# Patient Record
Sex: FEMALE | Race: BLACK OR AFRICAN AMERICAN | Employment: FULL TIME | URBAN - METROPOLITAN AREA
[De-identification: names, ages, dates, MRNs, and addresses within clinical notes are randomized per-mention and may not be internally consistent; named-entity substitution may affect disease eponyms.]

---

## 2020-10-08 ENCOUNTER — HOSPITAL ENCOUNTER (EMERGENCY)
Age: 50
Discharge: HOME | End: 2020-10-08
Attending: EMERGENCY MEDICINE
Payer: COMMERCIAL

## 2020-10-08 ENCOUNTER — APPOINTMENT (OUTPATIENT)
Dept: GENERAL RADIOLOGY | Age: 50
End: 2020-10-08
Payer: COMMERCIAL

## 2020-10-08 VITALS
BODY MASS INDEX: 46.55 KG/M2 | SYSTOLIC BLOOD PRESSURE: 129 MMHG | TEMPERATURE: 98.1 F | WEIGHT: 271.2 LBS | HEART RATE: 78 BPM | OXYGEN SATURATION: 99 % | RESPIRATION RATE: 15 BRPM | DIASTOLIC BLOOD PRESSURE: 89 MMHG

## 2020-10-08 LAB
BILIRUBIN URINE: NEGATIVE
BLOOD, URINE: NEGATIVE
CLARITY: CLEAR
COLOR: YELLOW
GLUCOSE URINE: NEGATIVE MG/DL
KETONES, URINE: NEGATIVE MG/DL
LEUKOCYTE ESTERASE, URINE: NEGATIVE
MICROSCOPIC EXAMINATION: NORMAL
NITRITE, URINE: NEGATIVE
PH UA: 6 (ref 5–8)
PROTEIN UA: NEGATIVE MG/DL
SPECIFIC GRAVITY UA: 1.02 (ref 1–1.03)
URINE TYPE: NORMAL
UROBILINOGEN, URINE: 1 E.U./DL

## 2020-10-08 PROCEDURE — 99284 EMERGENCY DEPT VISIT MOD MDM: CPT

## 2020-10-08 PROCEDURE — 81003 URINALYSIS AUTO W/O SCOPE: CPT

## 2020-10-08 PROCEDURE — 72170 X-RAY EXAM OF PELVIS: CPT

## 2020-10-08 RX ORDER — METHOCARBAMOL 750 MG/1
750 TABLET, FILM COATED ORAL 3 TIMES DAILY PRN
Qty: 30 TABLET | Refills: 1 | Status: SHIPPED | OUTPATIENT
Start: 2020-10-08 | End: 2020-10-18

## 2020-10-08 RX ORDER — IBUPROFEN 600 MG/1
600 TABLET ORAL EVERY 8 HOURS PRN
Qty: 20 TABLET | Refills: 1 | Status: SHIPPED | OUTPATIENT
Start: 2020-10-08

## 2020-10-08 ASSESSMENT — PAIN DESCRIPTION - DESCRIPTORS: DESCRIPTORS: DULL

## 2020-10-08 ASSESSMENT — PAIN DESCRIPTION - PAIN TYPE: TYPE: ACUTE PAIN

## 2020-10-08 ASSESSMENT — PAIN SCALES - GENERAL: PAINLEVEL_OUTOF10: 6

## 2020-10-08 ASSESSMENT — PAIN DESCRIPTION - ORIENTATION: ORIENTATION: RIGHT

## 2020-10-08 ASSESSMENT — PAIN DESCRIPTION - LOCATION: LOCATION: PELVIS

## 2020-10-08 NOTE — ED NOTES
1855:  Reviewed d/c instructions with pt. Patient discharged home with scripts  X 2. Gait steady. No complications. Reviewed d/c instructions with pt.       Seun Govea RN  10/08/20 7583

## 2020-10-09 NOTE — ED PROVIDER NOTES
TRIAGE CHIEF COMPLAINT:   Chief Complaint   Patient presents with    Pelvic Pain     R sided pelvic pain x 1.5 weeks. + nausea, + chills. Denies vomiting, denies diarrhea. HPI: Modesta Hernandez is a 44 y.o. female who presents to the Emergency Department with complaint of right inguinal pain which has been present for about 2 weeks. Pain is been worse over the last week. It worsens with change in position. She denies any recent injury but states several months ago she slipped and fell at work. She saw her primary care doctor after that but did not have any initial inguinal pain. She does have a history of lower back pain. She has had some occasional nausea but no vomiting diarrhea or constipation. Denies fever or chills. She denies abdominal pain and states her appetite is been good. She has no UTI symptoms or flank pain. Denies numbness or weakness. No bowel or bladder complaint. She has not been taking any medication for her symptoms. She has had previous hysterectomy. REVIEW OF SYSTEMS:  6 systems reviewed. Pertinent positives per HPI. Otherwise noted to be negative. Nursing notes reviewed and agree with above. Past medical/surgical history reviewed. MEDICATIONS   Discharge Medication List as of 10/8/2020  6:47 PM      START taking these medications    Details   methocarbamol (ROBAXIN-750) 750 MG tablet Take 1 tablet by mouth 3 times daily as needed (muscle soreness or spasm), Disp-30 tablet,R-1Print         CONTINUE these medications which have CHANGED    Details   ibuprofen (IBU) 600 MG tablet Take 1 tablet by mouth every 8 hours as needed for Pain or Fever (with food), Disp-20 tablet,R-1Print         CONTINUE these medications which have NOT CHANGED    Details   clotrimazole-betamethasone (LOTRISONE) 1-0.05 % cream Apply topically 2 times daily. , Disp-45 g, R-1, Print               ALLERGIES No Known Allergies      /89   Pulse 78   Temp 98.1 °F (36.7 °C) (Oral)   Resp 15 Wt 271 lb 3.2 oz (123 kg)   SpO2 99%   BMI 46.55 kg/m²   General:  No acute distress. Non toxic appearance. BMI is 46.5. Head:   Normocephalic and atraumatic  Eyes:   Conjunctiva clear, CIRO, EOM's intact. Sclera anicteric. ENT:   Mucous membranes moist  Neck:   Supple. No adenopathy or jugular venous distension  Lungs/Chest:  No respiratory distress. Lungs are clear bilaterally. CVS:   Regular rate and rhythm  Abdomen: Bowel sounds are normal.  Soft without mass. She has some mild tenderness along the right inguinal area. I do not feel any adenopathy. I see no evidence of hernia. Extremities:  Full range of motion. Complains of pain with some motions of the right hip but has normal rotation and no deformity. No knee calf or ankle tenderness. Distal neurovascular exams intact. Skin:   No rashes or lesions to exposed skin  Back:   No CVA tenderness. Patient has right paralumbar muscle tenderness with some palpable spasm. Back range of motion is significantly decreased in all directions except forward flexion. Straight leg raises are negative to 80 degrees bilaterally. There is no foot drop. No weakness in the hamstrings, quadriceps, gastrocnemius or EHL bilaterally. Normal sensation to light touch. Reflexes are symmetrical.  Neuro:  Alert and OX3. Speech clear and appropriate. No upper/lower extremity weakness. Normal sensation in all extremities. No facial asymmetry or weakness. Gait normal.  Psych:   Affect normal. Mood normal        RADIOLOGY:  XR PELVIS (1-2 VIEWS)   Final Result      No acute osseous abnormality. Mild bilateral hip osteoarthrosis. LAB  Labs Reviewed   URINALYSIS    Narrative:     Performed at:  [facility]  [address] Bol[address]   Phone [phone]       ED COURSE / MDM:  24-year-old female with right-sided inguinal pain for several weeks worsening over the last week.   This is positional in nature. She did slip and fall in the job several months ago and injured her back. Patient has right paraspinal muscle tenderness and spasm with decreased range of motion and may have aggravated the inguinal area secondary to issues with her lower back. I see no clinical evidence of hernia. Abdomen is benign. No UTI symptoms. Urinalysis was normal.  No evidence to suggest ureteral colic or pyelonephritis. She is neurologically intact. X-rays of the pelvis showed mild bilateral hip osteoarthritis and otherwise were negative. Recommended a trial of ibuprofen for pain and Robaxin for muscle tightness. She declines any time off from work. Advise follow-up with the [facility]. medical clinic to get a primary care doctor. I discussed with Sylvia Ramirez the results of the evaluation in the Emergency Department, diagnosis, care, prognosis and the importance of follow-up. The patient is stable for discharge. The patient and/or family are in agreement with the plan and all questions have been answered. Specific discharge instructions were explained, including reasons to return to the emergency department.       (Please note that portions of this note may have been completed with a voice recognition program.  Efforts were made to edit the dictation but occasionally words are mis-transcribed)        FINAL IMPRESSION:  1 --right inguinal strain                     Bailey Enriquez MD  10/09/20 6551 EENMT

## 2023-06-20 ENCOUNTER — HOSPITAL ENCOUNTER (EMERGENCY)
Age: 53
Discharge: HOME | End: 2023-06-20
Attending: EMERGENCY MEDICINE
Payer: COMMERCIAL

## 2023-06-20 VITALS
HEART RATE: 80 BPM | HEIGHT: 63 IN | RESPIRATION RATE: 18 BRPM | SYSTOLIC BLOOD PRESSURE: 133 MMHG | OXYGEN SATURATION: 97 % | DIASTOLIC BLOOD PRESSURE: 86 MMHG | TEMPERATURE: 98.8 F | WEIGHT: 182.76 LBS | BODY MASS INDEX: 32.38 KG/M2

## 2023-06-20 DIAGNOSIS — H10.33 ACUTE CONJUNCTIVITIS OF BOTH EYES, UNSPECIFIED ACUTE CONJUNCTIVITIS TYPE: Primary | ICD-10-CM

## 2023-06-20 PROCEDURE — 99283 EMERGENCY DEPT VISIT LOW MDM: CPT

## 2023-06-20 RX ORDER — CIPROFLOXACIN HYDROCHLORIDE 3.5 MG/ML
SOLUTION/ DROPS TOPICAL
Qty: 5 ML | Refills: 0 | Status: SHIPPED | OUTPATIENT
Start: 2023-06-20 | End: 2023-06-30

## 2023-06-20 ASSESSMENT — PAIN - FUNCTIONAL ASSESSMENT
PAIN_FUNCTIONAL_ASSESSMENT: 0-10
PAIN_FUNCTIONAL_ASSESSMENT: 0-10

## 2023-06-20 ASSESSMENT — VISUAL ACUITY
OD: 20/15
OU: 20/20
OS: 20/25

## 2023-06-20 ASSESSMENT — PAIN SCALES - GENERAL
PAINLEVEL_OUTOF10: 5
PAINLEVEL_OUTOF10: 3

## 2023-06-20 ASSESSMENT — PAIN DESCRIPTION - LOCATION: LOCATION: EYE

## 2023-06-20 ASSESSMENT — PAIN DESCRIPTION - ORIENTATION: ORIENTATION: LEFT

## 2023-06-20 ASSESSMENT — ENCOUNTER SYMPTOMS
EYE PAIN: 0
EYE ITCHING: 1
SORE THROAT: 0
EYE DISCHARGE: 1
PHOTOPHOBIA: 0
EYE REDNESS: 1

## 2023-06-20 ASSESSMENT — PAIN DESCRIPTION - DESCRIPTORS: DESCRIPTORS: BURNING

## 2023-06-20 NOTE — ED TRIAGE NOTES
Patient presents to the ED via private car with complaints of left eye itching, redness, and crusting. This started yesterday morning and was crusted over this morning. Denies any other sick symptoms. She has tried eye drops and states that they burned. BP is slightly elevated. Other VS are WNL. Patient is alert and oriented x4. Family at bedside.

## 2023-06-20 NOTE — ED PROVIDER NOTES
She is alert. DIAGNOSTIC RESULTS     EMERGENCY DEPARTMENT COURSE and DIFFERENTIAL DIAGNOSIS/MDM:   Vitals:    Vitals:    06/20/23 0913   BP: 133/86   Pulse: 85   Resp: 20   Temp: 98.8 °F (37.1 °C)   TempSrc: Oral   SpO2: 97%   Weight: 182 lb 12.2 oz (82.9 kg)   Height: 5' 3\" (1.6 m)     Patient was given the following medications:  Medications - No data to display      Patient was reassessed multiple times while in the emergency department. Patient remained stable    Is this patient to be included in the SEP-1 Core Measure due to severe sepsis or septic shock? No   Exclusion criteria - the patient is NOT to be included for SEP-1 Core Measure due to:  2+ SIRS criteria are not met      I am the Primary Clinician of Record. MDM  Number of Diagnoses or Management Options  Diagnosis management comments: The patient was felt to have acute conjunctivitis. She does not use contacts. I have no concerns about other serious problems. Her cornea was clear. There was no corneal ulcers or foreign bodies. FINAL IMPRESSION      1. Acute conjunctivitis of both eyes, unspecified acute conjunctivitis type          DISPOSITION/PLAN   DISPOSITION Decision To Discharge 06/20/2023 09:22:24 AM      PATIENT REFERRED TO:  No follow-up provider specified. DISCHARGE MEDICATIONS:  New Prescriptions    CIPROFLOXACIN (CILOXAN) 0.3 % OPHTHALMIC SOLUTION    One drop to the affected eye every 2 hours while awake on day 1 then 4 times a day for the next 9 days, 10 days total treatment. Controlled Substances Monitoring:     No flowsheet data found. (Please note that portions of this note were completed with a voice recognition program.  Efforts were made to edit the dictations but occasionally words are mis-transcribed. )    Angelika Lee MD (electronically signed)  Attending Emergency Physician            Mounika Patton MD  06/20/23 7723

## 2023-06-20 NOTE — DISCHARGE INSTRUCTIONS
Put the drops in both eyes since it starting to spread to your right eye. Follow-up with a eye specialist if you are not completely better in 1 week sooner if worse or problems.   Seek medical attention immediately if it worsens or you have eye pain or concerns

## 2023-06-20 NOTE — ED NOTES
D/C: Order noted for d/c. Pt confirmed d/c paperwork and one prescription has correct name. Discharge and education instructions reviewed with patient. Teach-back successful. Pt verbalized understanding and signed d/c papers. Pt denied questions at this time. No acute distress noted. Patient instructed to follow-up as noted - return to emergency department if symptoms worsen. Patient verbalized understanding. Discharged per EDMD with discharge instructions. Pt discharged to private vehicle. Patient stable upon departure. Thanked patient for choosing [facility] for care. Provider aware of patient pain at time of discharge.        Vista Hatchet, RN  06/20/23 3357

## 2023-11-12 ENCOUNTER — APPOINTMENT (OUTPATIENT)
Dept: CT IMAGING | Age: 53
End: 2023-11-12
Payer: COMMERCIAL

## 2023-11-12 ENCOUNTER — HOSPITAL ENCOUNTER (EMERGENCY)
Age: 53
Discharge: HOME | End: 2023-11-12
Attending: STUDENT IN AN ORGANIZED HEALTH CARE EDUCATION/TRAINING PROGRAM
Payer: COMMERCIAL

## 2023-11-12 VITALS
HEIGHT: 64 IN | WEIGHT: 184.3 LBS | SYSTOLIC BLOOD PRESSURE: 155 MMHG | TEMPERATURE: 98 F | DIASTOLIC BLOOD PRESSURE: 91 MMHG | OXYGEN SATURATION: 100 % | BODY MASS INDEX: 31.47 KG/M2 | RESPIRATION RATE: 18 BRPM | HEART RATE: 67 BPM

## 2023-11-12 DIAGNOSIS — M54.50 ACUTE EXACERBATION OF CHRONIC LOW BACK PAIN: ICD-10-CM

## 2023-11-12 DIAGNOSIS — G89.29 ACUTE EXACERBATION OF CHRONIC LOW BACK PAIN: ICD-10-CM

## 2023-11-12 DIAGNOSIS — R10.9 ACUTE ABDOMINAL PAIN IN RIGHT FLANK: Primary | ICD-10-CM

## 2023-11-12 LAB
ALBUMIN SERPL-MCNC: 4.1 G/DL (ref 3.4–5)
ALBUMIN/GLOB SERPL: 1.4 {RATIO} (ref 1.1–2.2)
ALP SERPL-CCNC: 76 U/L (ref 40–129)
ALT SERPL-CCNC: 9 U/L (ref 10–40)
ANION GAP SERPL CALCULATED.3IONS-SCNC: 15 MMOL/L (ref 3–16)
AST SERPL-CCNC: 26 U/L (ref 15–37)
BASOPHILS # BLD: 0 K/UL (ref 0–0.2)
BASOPHILS NFR BLD: 0.4 %
BILIRUB SERPL-MCNC: 0.3 MG/DL (ref 0–1)
BILIRUB UR QL STRIP.AUTO: NEGATIVE
BUN SERPL-MCNC: 10 MG/DL (ref 7–20)
CALCIUM SERPL-MCNC: 9.3 MG/DL (ref 8.3–10.6)
CHLORIDE SERPL-SCNC: 103 MMOL/L (ref 99–110)
CLARITY UR: CLEAR
CO2 SERPL-SCNC: 22 MMOL/L (ref 21–32)
COLOR UR: YELLOW
CREAT SERPL-MCNC: 0.8 MG/DL (ref 0.6–1.1)
DEPRECATED RDW RBC AUTO: 14.5 % (ref 12.4–15.4)
EOSINOPHIL # BLD: 0.1 K/UL (ref 0–0.6)
EOSINOPHIL NFR BLD: 2.4 %
GFR SERPLBLD CREATININE-BSD FMLA CKD-EPI: >60 ML/MIN/{1.73_M2}
GLUCOSE SERPL-MCNC: 76 MG/DL (ref 70–99)
GLUCOSE UR STRIP.AUTO-MCNC: NEGATIVE MG/DL
HCT VFR BLD AUTO: 39.9 % (ref 36–48)
HGB BLD-MCNC: 13.1 G/DL (ref 12–16)
HGB UR QL STRIP.AUTO: NEGATIVE
KETONES UR STRIP.AUTO-MCNC: ABNORMAL MG/DL
LEUKOCYTE ESTERASE UR QL STRIP.AUTO: NEGATIVE
LYMPHOCYTES # BLD: 1 K/UL (ref 1–5.1)
LYMPHOCYTES NFR BLD: 18.2 %
MCH RBC QN AUTO: 29.3 PG (ref 26–34)
MCHC RBC AUTO-ENTMCNC: 32.9 G/DL (ref 31–36)
MCV RBC AUTO: 89 FL (ref 80–100)
MONOCYTES # BLD: 0.4 K/UL (ref 0–1.3)
MONOCYTES NFR BLD: 7.1 %
NEUTROPHILS # BLD: 4.1 K/UL (ref 1.7–7.7)
NEUTROPHILS NFR BLD: 71.9 %
NITRITE UR QL STRIP.AUTO: NEGATIVE
PH UR STRIP.AUTO: 6 [PH] (ref 5–8)
PLATELET # BLD AUTO: 222 K/UL (ref 135–450)
PMV BLD AUTO: 9.2 FL (ref 5–10.5)
POTASSIUM SERPL-SCNC: 4.4 MMOL/L (ref 3.5–5.1)
PROT SERPL-MCNC: 7 G/DL (ref 6.4–8.2)
PROT UR STRIP.AUTO-MCNC: NEGATIVE MG/DL
RBC # BLD AUTO: 4.49 M/UL (ref 4–5.2)
SODIUM SERPL-SCNC: 140 MMOL/L (ref 136–145)
SP GR UR STRIP.AUTO: 1.02 (ref 1–1.03)
UA COMPLETE W REFLEX CULTURE PNL UR: ABNORMAL
UA DIPSTICK W REFLEX MICRO PNL UR: ABNORMAL
URN SPEC COLLECT METH UR: ABNORMAL
UROBILINOGEN UR STRIP-ACNC: 1 E.U./DL
WBC # BLD AUTO: 5.7 K/UL (ref 4–11)

## 2023-11-12 PROCEDURE — 96374 THER/PROPH/DIAG INJ IV PUSH: CPT

## 2023-11-12 PROCEDURE — 99284 EMERGENCY DEPT VISIT MOD MDM: CPT

## 2023-11-12 PROCEDURE — 6360000002 HC RX W HCPCS: Performed by: STUDENT IN AN ORGANIZED HEALTH CARE EDUCATION/TRAINING PROGRAM

## 2023-11-12 PROCEDURE — 36415 COLL VENOUS BLD VENIPUNCTURE: CPT

## 2023-11-12 PROCEDURE — 80053 COMPREHEN METABOLIC PANEL: CPT

## 2023-11-12 PROCEDURE — 6370000000 HC RX 637 (ALT 250 FOR IP): Performed by: STUDENT IN AN ORGANIZED HEALTH CARE EDUCATION/TRAINING PROGRAM

## 2023-11-12 PROCEDURE — 81003 URINALYSIS AUTO W/O SCOPE: CPT

## 2023-11-12 PROCEDURE — 85025 COMPLETE CBC W/AUTO DIFF WBC: CPT

## 2023-11-12 PROCEDURE — 74176 CT ABD & PELVIS W/O CONTRAST: CPT

## 2023-11-12 RX ORDER — KETOROLAC TROMETHAMINE 15 MG/ML
15 INJECTION, SOLUTION INTRAMUSCULAR; INTRAVENOUS ONCE
Status: COMPLETED | OUTPATIENT
Start: 2023-11-12 | End: 2023-11-12

## 2023-11-12 RX ORDER — METHOCARBAMOL 750 MG/1
750 TABLET, FILM COATED ORAL EVERY 6 HOURS PRN
COMMUNITY
Start: 2021-08-03 | End: 2023-11-12 | Stop reason: SDUPTHER

## 2023-11-12 RX ORDER — ACETAMINOPHEN 500 MG
1000 TABLET ORAL EVERY 8 HOURS PRN
COMMUNITY
Start: 2023-01-20

## 2023-11-12 RX ORDER — METHOCARBAMOL 750 MG/1
750 TABLET, FILM COATED ORAL EVERY 6 HOURS PRN
Qty: 40 TABLET | Refills: 0 | Status: SHIPPED | OUTPATIENT
Start: 2023-11-12

## 2023-11-12 RX ORDER — CYCLOBENZAPRINE HCL 10 MG
10 TABLET ORAL ONCE
Status: COMPLETED | OUTPATIENT
Start: 2023-11-12 | End: 2023-11-12

## 2023-11-12 RX ADMIN — CYCLOBENZAPRINE 10 MG: 10 TABLET, FILM COATED ORAL at 21:34

## 2023-11-12 RX ADMIN — KETOROLAC TROMETHAMINE 15 MG: 15 INJECTION, SOLUTION INTRAMUSCULAR; INTRAVENOUS at 21:33

## 2023-11-12 ASSESSMENT — PAIN SCALES - GENERAL: PAINLEVEL_OUTOF10: 7

## 2023-11-12 ASSESSMENT — PAIN DESCRIPTION - LOCATION: LOCATION: GROIN;BACK

## 2023-11-12 ASSESSMENT — PAIN DESCRIPTION - PAIN TYPE: TYPE: ACUTE PAIN

## 2023-11-12 ASSESSMENT — PAIN - FUNCTIONAL ASSESSMENT: PAIN_FUNCTIONAL_ASSESSMENT: 0-10

## 2023-11-13 NOTE — ED TRIAGE NOTES
started at the same time. States this pain is worse with lifting. Patient reports some increased tingling/numbness to left leg from baseline. Patient denies loss of bowel or bladder control. Reports change in vaginal discharge. States increased pressure with urination. Patient resting on bed, respirations even and easy at this time. Patient appears uncomfortable.

## 2023-11-13 NOTE — ED NOTES
Patient ambulatory to bathroom for urine sample at this time. MD notified of patient presentation.      Areli Lopez RN  11/12/23 1940

## 2023-11-13 NOTE — ED NOTES
Report given to Erick De La Torre, RN at this time, all questions answered. Denies any further questions at this time. No further patient contact.      Jaye Bertrand RN  11/12/23 0879

## 2023-11-15 NOTE — ED PROVIDER NOTES
[address] [facility],[address] ENCOUNTER        Pt Name: Bubba Garcia  MRN: [id number]  [address] [facility] [place] 1970  Date of evaluation: 11/12/2023  Provider: Zelalem Cedillo MD  PCP: Hesham Whitesdie  Note Started: 6:40 AM EST 11/15/23    CHIEF COMPLAINT       Back pain    HISTORY OF PRESENT ILLNESS: 1 or more Elements     History from : Patient    Limitations to history : None    Bubba Garcia is a 52 y.o. female who presents with left-sided back pain, radiates down the left leg, also some radiation down the right leg, sensation of numbness and tingling to the left leg but no loss of sensation. No saddle anesthesia. No bladder or bowel incontinence. No fevers. History of back pain, prior back surgeries. No IV drug use. Upcoming appoint with Dr. Terese Link with [facility]. Symptoms not otherwise alleviated or exacerbated by other factors. Nursing Notes were all reviewed and agreed with or any disagreements were addressed in the HPI. REVIEW OF SYSTEMS :      Positives and Pertinent negatives as per HPI. ROS otherwise unremarkable. SURGICAL HISTORY     Past Surgical History:   Procedure Laterality Date    BARIATRIC SURGERY      CHOLECYSTECTOMY      HYSTERECTOMY (CERVIX STATUS UNKNOWN)          [facility]       Discharge Medication List as of 11/12/2023 11:27 PM        CONTINUE these medications which have NOT CHANGED    Details   acetaminophen (TYLENOL) 500 MG tablet Take 2 tablets by mouth every 8 hours as neededHistorical Med      ibuprofen (IBU) 600 MG tablet Take 1 tablet by mouth every 8 hours as needed for Pain or Fever (with food), Disp-20 tablet,R-1Print             ALLERGIES     Morphine    FAMILYHISTORY     History reviewed. No pertinent family history. SOCIAL HISTORY       Social History     Tobacco Use    Smoking status: Some Days    Smokeless tobacco: Never   Substance Use Topics    Alcohol use: Yes     Comment: occas    Drug use:  No

## 2024-10-19 ENCOUNTER — APPOINTMENT (OUTPATIENT)
Dept: GENERAL RADIOLOGY | Age: 54
End: 2024-10-19
Attending: EMERGENCY MEDICINE
Payer: COMMERCIAL

## 2024-10-19 ENCOUNTER — HOSPITAL ENCOUNTER (EMERGENCY)
Age: 54
Discharge: HOME | End: 2024-10-19
Attending: EMERGENCY MEDICINE
Payer: COMMERCIAL

## 2024-10-19 VITALS
WEIGHT: 192.02 LBS | OXYGEN SATURATION: 97 % | DIASTOLIC BLOOD PRESSURE: 89 MMHG | HEIGHT: 63 IN | HEART RATE: 72 BPM | TEMPERATURE: 98.5 F | SYSTOLIC BLOOD PRESSURE: 134 MMHG | RESPIRATION RATE: 16 BRPM | BODY MASS INDEX: 34.02 KG/M2

## 2024-10-19 DIAGNOSIS — S52.501A CLOSED FRACTURE OF DISTAL END OF RIGHT RADIUS, UNSPECIFIED FRACTURE MORPHOLOGY, INITIAL ENCOUNTER: Primary | ICD-10-CM

## 2024-10-19 PROCEDURE — 6370000000 HC RX 637 (ALT 250 FOR IP): Performed by: EMERGENCY MEDICINE

## 2024-10-19 PROCEDURE — 99284 EMERGENCY DEPT VISIT MOD MDM: CPT

## 2024-10-19 PROCEDURE — 73110 X-RAY EXAM OF WRIST: CPT

## 2024-10-19 PROCEDURE — 29125 APPL SHORT ARM SPLINT STATIC: CPT

## 2024-10-19 RX ORDER — IBUPROFEN 800 MG/1
800 TABLET, FILM COATED ORAL EVERY 8 HOURS PRN
Qty: 20 TABLET | Refills: 0 | Status: SHIPPED | OUTPATIENT
Start: 2024-10-19

## 2024-10-19 RX ORDER — IBUPROFEN 800 MG/1
800 TABLET, FILM COATED ORAL ONCE
Status: COMPLETED | OUTPATIENT
Start: 2024-10-19 | End: 2024-10-19

## 2024-10-19 RX ADMIN — IBUPROFEN 800 MG: 800 TABLET, FILM COATED ORAL at 11:51

## 2024-10-19 ASSESSMENT — PAIN DESCRIPTION - FREQUENCY: FREQUENCY: CONTINUOUS

## 2024-10-19 ASSESSMENT — PAIN SCALES - GENERAL
PAINLEVEL_OUTOF10: 5
PAINLEVEL_OUTOF10: 5
PAINLEVEL_OUTOF10: 3

## 2024-10-19 ASSESSMENT — PAIN DESCRIPTION - ONSET: ONSET: ON-GOING

## 2024-10-19 ASSESSMENT — PAIN - FUNCTIONAL ASSESSMENT
PAIN_FUNCTIONAL_ASSESSMENT: PREVENTS OR INTERFERES SOME ACTIVE ACTIVITIES AND ADLS
PAIN_FUNCTIONAL_ASSESSMENT: ACTIVITIES ARE NOT PREVENTED

## 2024-10-19 ASSESSMENT — PAIN DESCRIPTION - LOCATION: LOCATION: WRIST

## 2024-10-19 ASSESSMENT — LIFESTYLE VARIABLES
HOW MANY STANDARD DRINKS CONTAINING ALCOHOL DO YOU HAVE ON A TYPICAL DAY: PATIENT DOES NOT DRINK
HOW OFTEN DO YOU HAVE A DRINK CONTAINING ALCOHOL: NEVER

## 2024-10-19 ASSESSMENT — PAIN DESCRIPTION - DESCRIPTORS: DESCRIPTORS: ACHING

## 2024-10-19 ASSESSMENT — PAIN DESCRIPTION - ORIENTATION: ORIENTATION: RIGHT

## 2024-10-19 ASSESSMENT — PAIN DESCRIPTION - PAIN TYPE: TYPE: ACUTE PAIN

## 2024-10-19 NOTE — ED PROVIDER NOTES
eMERGENCY dEPARTMENT eNCOUnter      Pt Name: Odessa Quintana  MRN: [id number]  Birthdate 1970  Date of evaluation: 10/19/2024  Provider: LINDA CORTES MD     CHIEF COMPLAINT       Chief Complaint   Patient presents with    Wrist Injury     Pt to ED with c/o injury to right wrist. Pt states she fell onto her right wrist yesterday evening while getting something off of a shelf, landing on her right wrist. Denies hitting her head, denies any other injury. C/o pain and swelling to right wrist and forearm.          HISTORY OF PRESENT ILLNESS   (Location/Symptom, Timing/Onset,Context/Setting, Quality, Duration, Modifying Factors, Severity) Note limiting factors.   HPI    Odessa Quintana is a 54 y.o. female who presents to the emergency department with a right wrist injury at home yesterday.  Patient states that she was trying to get something off the shelf when she slipped landed on her outstretched hand and moderate swelling to the wrist.  Patient states movement makes it worse.  Patient denies any other injury.  Patient states from the elbow to the wrist and hand it swollen and tender.  There is no obvious deformity.  Patient is right-hand dominant.    Nursing Notes were reviewed.    REVIEW OFSYSTEMS    (2+ for level 4; 10+ for level 5)   Review of Systems    General: No fevers, chills or night sweats, No weight loss    Head:  No Sore throat,  No Ear Pain    Chest:  Nontender.  No Cough, No SOB,  Chest Pain    GI: No abdominal pain or vomiting    : No dysuria or hematuria    Musculoskeletal: No unrelenting pain or night pain.  Right hand swelling and pain    Neurologic: No bowel or bladder incontinence, No saddle anesthesia, No leg weakness    All other systems reviewed and are negative.        PAST MEDICAL HISTORY     Past Medical History:   Diagnosis Date    Arthritis     Back pain     Knee pain     Shoulder pain        SURGICAL HISTORY       Past Surgical History:   Procedure Laterality Date    BARIATRIC

## 2024-10-19 NOTE — ED NOTES
Volar splint applied to RUE by ED hernán Schultz with this RN present to assist. Circulation checks wnl s/p splint placement. Pt tolerated well, no complications noted or reported. Education provided to pt regarding splint. Pt verbalized understanding, all questions answered. Splint checked by ED MD Mendez s/p placement.

## 2024-10-19 NOTE — ED NOTES
Ice pack applied to right wrist as ordered by ED MD. Pt tolerating well, will continue to monitor.

## 2024-10-19 NOTE — DISCHARGE INSTRUCTIONS
Made continue to use ice.  Take ibuprofen for pain and swelling.  Keep the wrist immobilized with a splint until seen by orthopedic.  Recommend follow-up.  Call on Monday for appointment.

## 2024-10-19 NOTE — ED NOTES
Sling applied to RUE by ED medic Antoine as ordered by ED MD. No complications noted or reported. Pt tolerated well. Education provided, pt verbalized understanding.

## 2024-10-19 NOTE — ED NOTES
Pt to ED with c/o injury to right wrist. Pt states she fell onto her right wrist yesterday evening while getting something off of a shelf, landing on her right wrist. Denies hitting her head, denies any other injury. C/o pain and swelling to right wrist and forearm.

## 2024-10-21 ENCOUNTER — TELEPHONE (OUTPATIENT)
Dept: ORTHOPEDIC SURGERY | Age: 54
End: 2024-10-21

## 2024-10-21 NOTE — TELEPHONE ENCOUNTER
Appointment Request     Patient requesting earlier appointment: Yes  Appointment offered to patient:10/28/24  Patient Contact Number: [phone]      PATIENT REQ A SOONER APPT FOR HER RT FOREARM INJURY    SHE IS CURRENTLY SCHEDULED ON 10/28/24    PLEASE CALL PATIENT BACK AT THE ABOVE NUMBER

## 2024-10-22 ENCOUNTER — OFFICE VISIT (OUTPATIENT)
Dept: ORTHOPEDIC SURGERY | Age: 54
End: 2024-10-22
Payer: COMMERCIAL

## 2024-10-22 VITALS — HEIGHT: 63 IN | BODY MASS INDEX: 34.02 KG/M2 | WEIGHT: 192 LBS

## 2024-10-22 DIAGNOSIS — S63.501A WRIST SPRAIN, RIGHT, INITIAL ENCOUNTER: ICD-10-CM

## 2024-10-22 DIAGNOSIS — M79.641 PAIN OF RIGHT HAND: Primary | ICD-10-CM

## 2024-10-22 PROCEDURE — 99202 OFFICE O/P NEW SF 15 MIN: CPT | Performed by: PHYSICIAN ASSISTANT

## 2024-10-24 ENCOUNTER — OFFICE VISIT (OUTPATIENT)
Dept: ORTHOPEDIC SURGERY | Age: 54
End: 2024-10-24

## 2024-10-24 VITALS — BODY MASS INDEX: 34.02 KG/M2 | HEIGHT: 63 IN | WEIGHT: 192 LBS | RESPIRATION RATE: 14 BRPM

## 2024-10-24 DIAGNOSIS — S63.501A WRIST SPRAIN, RIGHT, INITIAL ENCOUNTER: Primary | ICD-10-CM

## 2024-10-24 NOTE — PROGRESS NOTES
This 54 y.o.  right hand dominant  is seen in hand surgery consultation for SHERRIE Chaves with a chief complaint of injury to their right wrist, which was injured 1 week ago when she took a low velocity fall.  She noticed pain, mild swelling, and slight bruising.  She was evaluated at the [facility] ED the next day.  Xrays were obtained, read as showing a \"possible fracture of the distal radius\" and the patient was splinted and referred for hand/upper extremity evaluation and treatment.  She was seen by Joaquin Ragsdale four days later.  Repeat Xrays were read as not showing a fracture and she was re splinted and  sent to me for consultation. There is no is no history of additional significant injury.  Symptoms have not changed since the date of injury. The pain assessment has been reviewed and is correct.    The patient's social history, past medical history, family history, medications, allergies and review of systems, entered 10/24/24,  have been reviewed, and dated and are recorded in the chart.    On physical examination the patient is Height: 160 cm (5' 3\") tall and weighs Weight - Scale: 87.1 kg (192 lb).  Respirations are 18 per minute.  The patient is well nourished, is oriented to time and place, demonstrates appropriate mood and affect as well as normal gait and station.  There is no soft tissue swelling present about the right wrist or hand, but mild swelling to the thumb and fingers due to tightness of the splint..  There is  minimal discoloration of the dorsal bases of the fingers.  There is no deformity.   Tenderness is not present on palpation in the area of the right distal radius. Ulna or carpus.  Range of motion of the hand and wrist is limited due to constant splinting and fear of pain,only on the right.    Skin is intact, as is distal circulation and sensation.  Gross muscle strength is limited only on the right   Hand and wrist joints are stable.  There are no

## 2024-10-29 ENCOUNTER — APPOINTMENT (OUTPATIENT)
Dept: CT IMAGING | Age: 54
End: 2024-10-29
Payer: COMMERCIAL

## 2024-10-29 ENCOUNTER — APPOINTMENT (OUTPATIENT)
Dept: GENERAL RADIOLOGY | Age: 54
End: 2024-10-29
Payer: COMMERCIAL

## 2024-10-29 ENCOUNTER — HOSPITAL ENCOUNTER (EMERGENCY)
Age: 54
Discharge: HOME | End: 2024-10-30
Attending: STUDENT IN AN ORGANIZED HEALTH CARE EDUCATION/TRAINING PROGRAM
Payer: COMMERCIAL

## 2024-10-29 VITALS
BODY MASS INDEX: 35.16 KG/M2 | HEIGHT: 63 IN | OXYGEN SATURATION: 98 % | TEMPERATURE: 98.2 F | RESPIRATION RATE: 16 BRPM | DIASTOLIC BLOOD PRESSURE: 101 MMHG | HEART RATE: 68 BPM | SYSTOLIC BLOOD PRESSURE: 113 MMHG | WEIGHT: 198.41 LBS

## 2024-10-29 DIAGNOSIS — S69.91XA INJURY OF RIGHT HAND, INITIAL ENCOUNTER: Primary | ICD-10-CM

## 2024-10-29 LAB
ANION GAP SERPL CALCULATED.3IONS-SCNC: 12 MMOL/L (ref 3–16)
BASOPHILS # BLD: 0 K/UL (ref 0–0.2)
BASOPHILS NFR BLD: 0.4 %
BUN SERPL-MCNC: 18 MG/DL (ref 7–20)
CALCIUM SERPL-MCNC: 9.4 MG/DL (ref 8.3–10.6)
CHLORIDE SERPL-SCNC: 105 MMOL/L (ref 99–110)
CO2 SERPL-SCNC: 24 MMOL/L (ref 21–32)
CREAT SERPL-MCNC: 0.8 MG/DL (ref 0.6–1.1)
DEPRECATED RDW RBC AUTO: 15.9 % (ref 12.4–15.4)
EOSINOPHIL # BLD: 0.2 K/UL (ref 0–0.6)
EOSINOPHIL NFR BLD: 4.2 %
ERYTHROCYTE [SEDIMENTATION RATE] IN BLOOD BY WESTERGREN METHOD: 9 MM/HR (ref 0–30)
GFR SERPLBLD CREATININE-BSD FMLA CKD-EPI: 86 ML/MIN/{1.73_M2}
GLUCOSE SERPL-MCNC: 88 MG/DL (ref 70–99)
HCT VFR BLD AUTO: 39 % (ref 36–48)
HGB BLD-MCNC: 12.8 G/DL (ref 12–16)
LYMPHOCYTES # BLD: 0.9 K/UL (ref 1–5.1)
LYMPHOCYTES NFR BLD: 15.7 %
MCH RBC QN AUTO: 28.6 PG (ref 26–34)
MCHC RBC AUTO-ENTMCNC: 32.8 G/DL (ref 31–36)
MCV RBC AUTO: 87.2 FL (ref 80–100)
MONOCYTES # BLD: 0.6 K/UL (ref 0–1.3)
MONOCYTES NFR BLD: 10.2 %
NEUTROPHILS # BLD: 4.1 K/UL (ref 1.7–7.7)
NEUTROPHILS NFR BLD: 69.5 %
PLATELET # BLD AUTO: 222 K/UL (ref 135–450)
PMV BLD AUTO: 9.2 FL (ref 5–10.5)
POTASSIUM SERPL-SCNC: 4.2 MMOL/L (ref 3.5–5.1)
RBC # BLD AUTO: 4.47 M/UL (ref 4–5.2)
SODIUM SERPL-SCNC: 141 MMOL/L (ref 136–145)
WBC # BLD AUTO: 6 K/UL (ref 4–11)

## 2024-10-29 PROCEDURE — 36415 COLL VENOUS BLD VENIPUNCTURE: CPT

## 2024-10-29 PROCEDURE — 6360000002 HC RX W HCPCS: Performed by: STUDENT IN AN ORGANIZED HEALTH CARE EDUCATION/TRAINING PROGRAM

## 2024-10-29 PROCEDURE — 73110 X-RAY EXAM OF WRIST: CPT

## 2024-10-29 PROCEDURE — 80048 BASIC METABOLIC PNL TOTAL CA: CPT

## 2024-10-29 PROCEDURE — 96374 THER/PROPH/DIAG INJ IV PUSH: CPT

## 2024-10-29 PROCEDURE — 86140 C-REACTIVE PROTEIN: CPT

## 2024-10-29 PROCEDURE — 99285 EMERGENCY DEPT VISIT HI MDM: CPT

## 2024-10-29 PROCEDURE — 6360000004 HC RX CONTRAST MEDICATION: Performed by: STUDENT IN AN ORGANIZED HEALTH CARE EDUCATION/TRAINING PROGRAM

## 2024-10-29 PROCEDURE — 85025 COMPLETE CBC W/AUTO DIFF WBC: CPT

## 2024-10-29 PROCEDURE — 73201 CT UPPER EXTREMITY W/DYE: CPT

## 2024-10-29 PROCEDURE — 85652 RBC SED RATE AUTOMATED: CPT

## 2024-10-29 RX ORDER — KETOROLAC TROMETHAMINE 15 MG/ML
15 INJECTION, SOLUTION INTRAMUSCULAR; INTRAVENOUS ONCE
Status: COMPLETED | OUTPATIENT
Start: 2024-10-29 | End: 2024-10-29

## 2024-10-29 RX ADMIN — IOMEPROL INJECTION 100 ML: 714 INJECTION, SOLUTION INTRAVASCULAR at 23:50

## 2024-10-29 RX ADMIN — KETOROLAC TROMETHAMINE 15 MG: 15 INJECTION, SOLUTION INTRAMUSCULAR; INTRAVENOUS at 23:40

## 2024-10-29 ASSESSMENT — PAIN - FUNCTIONAL ASSESSMENT: PAIN_FUNCTIONAL_ASSESSMENT: 0-10

## 2024-10-29 ASSESSMENT — PAIN SCALES - GENERAL
PAINLEVEL_OUTOF10: 8
PAINLEVEL_OUTOF10: 8

## 2024-10-30 LAB — CRP SERPL-MCNC: <3 MG/L (ref 0–5.1)

## 2024-10-30 NOTE — DISCHARGE INSTRUCTIONS
Today you are seen here Emergency Department for a hand injury.  Your CT scans here today were reassuring.  Need to follow-up with the orthopedic doctors.  Return for worsening pain fevers chills and new/concerning symptoms such as worsening arm pain swelling chest pain or difficulty in breathing    CT HAND RIGHT W CONTRAST   Final Result   1. No evidence of acute fracture or dislocation.   2. Subcutaneous soft tissue swelling in the thumb and index finger suggesting   edema.         CT WRIST RIGHT W CONTRAST   Final Result   No acute osseous abnormality.         XR WRIST RIGHT (MIN 3 VIEWS)   Final Result   No evidence for a healing fracture

## 2024-10-30 NOTE — ED PROVIDER NOTES
Protein    Inpatient consult to Orthopedic Surgery        Medical reasoning and significant workup findings:   No acute distress, vital signs here are reassuring  Patient is here today with acute on chronic right hand pain she has been seen by orthopedic surgery previously for this and they felt the symptoms were more consistent with a sprain but she is here today for a second opinion.  On examination there is evidence of swelling, I do not believe the swelling is indicated of an infection this is further supported with the CT images here and normal laboratory studies.  The swelling appears to be more or less following a specific track of the ligament does not appear to be diffusely swollen throughout the entire arm he close concern for DVT.  Spoke with orthopedic surgery regarding the CT scan findings and workup here today he recommended placing patient in a Velcro wrist splint and to follow-up with orthopedics outpatient is reasonable.  Patient is counseled return precaution         Impression: Wrist and hand pain    Patient's results were discussed with the patient. Patient's questions were answered. I reviewed the patients medical records and noted there allergies, past medical history, and previous visits, pertinent information summarized in HPI. I reviewed the nursing notes.    Feel that patient is appropriate for discharge to follow up with orthopedic surgery. Patient agreeable with this plan. Return precautions given. Patient discharged in stable condition.           Mohinder Newman MD  10/30/24 7958

## 2024-10-30 NOTE — ED NOTES
D/C: Order noted for d/c. Pt confirmed d/c paperwork has correct name. Discharge and education instructions reviewed with patient. Teach-back successful.  Pt verbalized understanding and denied questions at this time. No acute distress noted. Patient instructed to follow-up as noted - return to emergency department if symptoms worsen. Patient verbalized understanding. Discharged per EDMD with discharge instructions. Pt discharged to private vehicle. Patient stable upon departure. Thanked patient for [facility] for care. Provider aware of patient pain at time of discharge. Pt. A&OX4 and ambulatory.    PAST SURGICAL HISTORY:  No significant past surgical history

## 2024-10-31 ENCOUNTER — OFFICE VISIT (OUTPATIENT)
Dept: ORTHOPEDIC SURGERY | Age: 54
End: 2024-10-31
Payer: MEDICARE

## 2024-10-31 VITALS — WEIGHT: 198 LBS | BODY MASS INDEX: 35.08 KG/M2 | HEIGHT: 63 IN

## 2024-10-31 DIAGNOSIS — S63.501A WRIST SPRAIN, RIGHT, INITIAL ENCOUNTER: Primary | ICD-10-CM

## 2024-10-31 PROCEDURE — 99214 OFFICE O/P EST MOD 30 MIN: CPT | Performed by: ORTHOPAEDIC SURGERY

## 2024-10-31 NOTE — PROGRESS NOTES
I last examined this patient one week ago at which time I diagnosed a low grade sprain of her right wrist, advised her to stop using her overly tight wrist splint, perform home exercises to correct her stiffness and call me in 10-14 days if she was not improving.  Five days later she presented to the ED at [facility] complaining of worsening pain.  Xrays were repeated and a CT scan was done, both normal.  She returns today with the same complaints as last week. She denies re injury.    The patient's social history, past medical history, family history, medications, allergies and review of systems have been reviewed, dated 10/22/24 and are recorded in the chart.    I have personally examined and reviewed all previous imaging studies(recent wrist Xrays and CT Scan of right hand and wrist) , laboratory tests(CRP, ESR, BMP, CBC+Diff) and medical encounters pertinent to this patient's visit today.     On examination there is less swelling of the right wrist and hand, no deformity, no discoloration and less tenderness at the injury site.  Range of motion is improved, accompanied by poor effort, unusual posturing and complaints of pain. Skin is intact, as is distal circulation and sensation.  Gross muscle strength is diminished on the right with poor patient effort.  Hand and wrist joints are stable.  There are no subcutaneous nodules or enlarged epitrochlear lymph nodes.     Review and independent interpretation of the recent external Xrays and CT scans  of the right hand and wrist demonstrate no fracture or dislocation.  The radiologist's report concurs.     Impression: Low grade sprain injury right wrist, with clinical evidence of improvement.    The patient is furnished with a copies of the radiologist's reports.     She is instructed about activity precautions and a range of motion exercise program, which is fully discussed and demonstrated.     The usual course of events in the resolution

## 2025-01-15 ENCOUNTER — HOSPITAL ENCOUNTER (EMERGENCY)
Age: 55
Discharge: HOME | End: 2025-01-15
Attending: EMERGENCY MEDICINE
Payer: MEDICARE

## 2025-01-15 ENCOUNTER — APPOINTMENT (OUTPATIENT)
Dept: GENERAL RADIOLOGY | Age: 55
End: 2025-01-15
Payer: MEDICARE

## 2025-01-15 VITALS
HEART RATE: 98 BPM | SYSTOLIC BLOOD PRESSURE: 124 MMHG | HEIGHT: 63 IN | RESPIRATION RATE: 16 BRPM | WEIGHT: 184.13 LBS | DIASTOLIC BLOOD PRESSURE: 86 MMHG | TEMPERATURE: 98.3 F | OXYGEN SATURATION: 99 % | BODY MASS INDEX: 32.62 KG/M2

## 2025-01-15 DIAGNOSIS — S39.012A STRAIN OF LUMBAR REGION, INITIAL ENCOUNTER: Primary | ICD-10-CM

## 2025-01-15 PROCEDURE — 99284 EMERGENCY DEPT VISIT MOD MDM: CPT

## 2025-01-15 PROCEDURE — 72100 X-RAY EXAM L-S SPINE 2/3 VWS: CPT

## 2025-01-15 PROCEDURE — 96372 THER/PROPH/DIAG INJ SC/IM: CPT

## 2025-01-15 PROCEDURE — 6370000000 HC RX 637 (ALT 250 FOR IP): Performed by: EMERGENCY MEDICINE

## 2025-01-15 PROCEDURE — 6360000002 HC RX W HCPCS: Performed by: EMERGENCY MEDICINE

## 2025-01-15 RX ORDER — LIDOCAINE 4 G/G
1 PATCH TOPICAL ONCE
Status: DISCONTINUED | OUTPATIENT
Start: 2025-01-15 | End: 2025-01-15 | Stop reason: HOSPADM

## 2025-01-15 RX ORDER — ORPHENADRINE CITRATE 30 MG/ML
60 INJECTION INTRAMUSCULAR; INTRAVENOUS ONCE
Status: COMPLETED | OUTPATIENT
Start: 2025-01-15 | End: 2025-01-15

## 2025-01-15 RX ORDER — NAPROXEN 500 MG/1
500 TABLET ORAL 2 TIMES DAILY WITH MEALS
Qty: 60 TABLET | Refills: 5 | Status: SHIPPED | OUTPATIENT
Start: 2025-01-15

## 2025-01-15 RX ORDER — NAPROXEN 250 MG/1
500 TABLET ORAL ONCE
Status: COMPLETED | OUTPATIENT
Start: 2025-01-15 | End: 2025-01-15

## 2025-01-15 RX ORDER — LIDOCAINE 4 G/G
1 PATCH TOPICAL DAILY
Qty: 30 PATCH | Refills: 0 | Status: SHIPPED | OUTPATIENT
Start: 2025-01-15 | End: 2025-02-14

## 2025-01-15 RX ADMIN — NAPROXEN SODIUM 500 MG: 250 TABLET ORAL at 20:45

## 2025-01-15 RX ADMIN — ORPHENADRINE CITRATE 60 MG: 60 INJECTION INTRAMUSCULAR; INTRAVENOUS at 20:44

## 2025-01-15 ASSESSMENT — PAIN DESCRIPTION - LOCATION
LOCATION: BACK
LOCATION: BACK

## 2025-01-15 ASSESSMENT — PAIN - FUNCTIONAL ASSESSMENT: PAIN_FUNCTIONAL_ASSESSMENT: 0-10

## 2025-01-15 ASSESSMENT — PAIN SCALES - GENERAL
PAINLEVEL_OUTOF10: 8
PAINLEVEL_OUTOF10: 10

## 2025-01-15 ASSESSMENT — PAIN DESCRIPTION - DESCRIPTORS: DESCRIPTORS: ACHING

## 2025-01-16 NOTE — ED PROVIDER NOTES
[facility] EMERGENCY DEPARTMENT  EMERGENCY DEPARTMENT ENCOUNTER        Pt Name: Odessa Quintana  MRN: [id number]  Birthdate 1970  Date of evaluation: 1/15/2025  Provider: Lew Quintana MD  PCP: Андрей De La Torre MD  Note Started: 9:22 PM EST 1/15/25    CHIEF COMPLAINT       Chief Complaint   Patient presents with    Back Pain     Pt states that she fell at 1917 today. Pt states that she has rods in he back. Pt states that he slipped and tried to catch herself and hurt her self worse. Pt denies LOC, hitting head. Pt is A&Ox4,UAL,VSS on RA. Pt is Afebrile.        HISTORY OF PRESENT ILLNESS: 1 or more Elements   History From: Patient        Odessa Quintana is a 54 y.o. female who presents for evaluation of left-sided paraspinal back pain after a fall.  Patient reports slipping on ice and falling onto her buttock approximate from his prior to arrival.  Denies head injury or loss of consciousness.  States he has been able to ambulate without difficulties.  She does report left-sided paraspinal back pain that radiates into the left buttock.  Denies numbness or weakness to lower extremities.  Denies incontinence of bowel or urine.  She has not taken medications for symptoms.  Does have a history of previous back surgery.     Nursing Notes were all reviewed and agreed with or any disagreements were addressed in the HPI.    REVIEW OF SYSTEMS :      Review of Systems    Positives and Pertinent negatives as per HPI.     SURGICAL HISTORY     Past Surgical History:   Procedure Laterality Date    BARIATRIC SURGERY      CHOLECYSTECTOMY      HYSTERECTOMY (CERVIX STATUS UNKNOWN)         CURRENTMEDICATIONS       Discharge Medication List as of 1/15/2025  8:57 PM        CONTINUE these medications which have NOT CHANGED    Details   !! ibuprofen (ADVIL;MOTRIN) 800 MG tablet Take 1 tablet by mouth every 8 hours as needed for Pain or Fever, Disp-20 tablet, R-0Normal      acetaminophen (TYLENOL) 500 MG tablet Take 2

## 2025-01-16 NOTE — ED TRIAGE NOTES
Pt states that she fell at 1917 today. Pt states that she has rods in he back. Pt states that he slipped and tried to catch herself and hurt her self worse. Pt denies LOC, hitting head. Pt is A&Ox4,UAL,VSS on RA. Pt is Afebrile.